# Patient Record
(demographics unavailable — no encounter records)

---

## 2017-01-16 NOTE — DX
Lumbar Spine and Sacroiliac Joints

 

Clinical History: 40-year-old female with chronic bilateral lower back pain, left SI joint pain with 
palpation, and occasional left lower extremity weakness.

 

ICD-10 Diagnostic Code: M54.5.

 

Comparison Study: None.

 

Findings:

 

LUMBAR SPINE (3 Views, at 10:09 a.m.):  There are 5 nonrib-bearing lumbar-type vertebral bodies. Ther
e is a mild lumbar levocurvature measuring only 5 degrees. There is a pelvic tilt, with the right oneil
ac crest higher than the left and a 15 mm left femoral limb length discrepancy. A T-shaped intrauteri
ne device is projected over the central pelvis. The lumbar vertebral body heights and posterior align
ments are maintained. There is some mild posterior L4-L5 degenerative disk space narrowing. There is 
no facet malalignment. The interpediculate distances are appropriate. There is some subchondral degen
erative sclerosis associated with the SI joints, left greater than right.

 

Impression:

1. Trace lumbar levocurvature with a pelvic tilt and a limb length discrepancy.

2. Mild posterior L4-L5 degenerative disk space narrowing.

3. SI joint degenerative changes, left greater than right.

 

 

BILATERAL SACROILIAC JOINTS (Three Views Total, at 9:52 a.m.):  There is a vacuum phenomenon associat
ed with the sacroiliac joints with degenerative subchondral sclerosis, left slightly greater than rig
ht. There is no marginal erosion or asymmetric diastasis. The sacral arcuate lines are well-contoured
. A T-shaped intrauterine device projects over the pelvis.

 

Impression:  There are bilateral degenerative changes associated with the SI joints.

 

If there further clinical concern regarding the patient's lumbago and left radicular symptoms, MR chris
ging may be of benefit.